# Patient Record
(demographics unavailable — no encounter records)

---

## 2025-06-19 NOTE — DISCUSSION/SUMMARY
[de-identified] : The underlying pathophysiology was reviewed with the patient. XR films were reviewed with the patient. Discussed at length the nature of the patients condition. The right hand symptoms are secondary to   Acute angulated fifth metacarpal neck fracture and Acute nondisplaced base of fourth metacarpal fracture.  I reassured the patient that his fractures will heal with time as bones take six weeks to heal.   A right short arm cast was applied in office today 06/19/2025. Cast instruction were given (i.e. keeping it dry, using a cast shower bag). The patient was instructed on ROM exercises of the shoulder, elbow, hand and wrist while casted. Furthermore, I recommended elevation and pumping of the hand to reduce edema if the cast becomes tight.  I advised to take Tylenol, Advil, or Aleve for pain management.  All questions answered, understanding verbalized. Patient in agreement with plan of care. Patient is advised to follow-up in 4 weeks for cast removal and repeat xrays.  If the patient begins to experience any changes or severe exacerbation of his symptoms, he should reach out to me as soon as possible.

## 2025-06-19 NOTE — PHYSICAL EXAM
[de-identified] : Patient is WDWN, alert, and in no acute distress. Breathing is unlabored. He is grossly oriented to person, place, and time.  Right Hand -  Inspection/Palpation: Tenderness over 5th MP joint and ring metacarpal base. Positive edema. Mild ecchymosis. No deformities. No discoloration.  Stability: No joint instability on provocative testing. Range of Motion: Limited by pain Strength: decreased Sensation is intact.    [de-identified] : Procedure was performed at the [facility]  EXAM:  HAND RIGHT   PROCEDURE DATE:  06/18/2025   INTERPRETATION:  CLINICAL INDICATION:Arthralgia of right hand  COMPARISON: None  TECHNIQUE: Right hand radiographs, 3 views  INTERPRETATION: There is an acute angulated fifth metacarpal neck fracture with apex dorsal angulation. Acute nondisplaced fracture lucency is present at the base of the fourth metacarpal with subtle impaction. No CMC joint dislocation is present. Joint spaces are maintained. Alignment is normal.  IMPRESSION: 1.  Acute angulated fifth metacarpal neck fracture.  2.  Acute nondisplaced base of fourth metacarpal fracture.  --- End of Report ---       SHLOMIT GOLDBERG STEIN MD; Attending Radiologist This document has been electronically signed. Jun 18 2025 12:05PM

## 2025-06-19 NOTE — ADDENDUM
[FreeTextEntry1] : I, Zion Brown wrote this note acting as a scribe for Dr. Xander Graves on 06/19/2025.   All medical record entries made by the Scribe were at my, Dr. Xander Graves MD., direction and personally dictated by me on 06/19/2025. I have personally reviewed the chart and agree that the record accurately reflects my personal performance of the history, physical exam, assessment and plan.

## 2025-06-19 NOTE — PHYSICAL EXAM
[de-identified] : Patient is WDWN, alert, and in no acute distress. Breathing is unlabored. He is grossly oriented to person, place, and time.  Right Hand -  Inspection/Palpation: Tenderness over 5th MP joint and ring metacarpal base. Positive edema. Mild ecchymosis. No deformities. No discoloration.  Stability: No joint instability on provocative testing. Range of Motion: Limited by pain Strength: decreased Sensation is intact.    [de-identified] : Procedure was performed at the [facility]  EXAM:  HAND RIGHT   PROCEDURE DATE:  06/18/2025   INTERPRETATION:  CLINICAL INDICATION:Arthralgia of right hand  COMPARISON: None  TECHNIQUE: Right hand radiographs, 3 views  INTERPRETATION: There is an acute angulated fifth metacarpal neck fracture with apex dorsal angulation. Acute nondisplaced fracture lucency is present at the base of the fourth metacarpal with subtle impaction. No CMC joint dislocation is present. Joint spaces are maintained. Alignment is normal.  IMPRESSION: 1.  Acute angulated fifth metacarpal neck fracture.  2.  Acute nondisplaced base of fourth metacarpal fracture.  --- End of Report ---       SHLOMIT GOLDBERG STEIN MD; Attending Radiologist This document has been electronically signed. Jun 18 2025 12:05PM

## 2025-06-19 NOTE — HISTORY OF PRESENT ILLNESS
[de-identified] : Pt presents with c/o right hand pain since 06/16/2025 s/p fall. Pt is an . 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 form a 3-4 feet height)  .Pt went to urgent care 2 day after his fall had xray done that shows an acute angulated fifth metacarpal neck fracture and acute nondisplaced base of fourth metacarpal fracture. Pt was placed on a splint. Pt removed the splint due to pain. Pt states he ha swelling, and numbness, states stretching fingers exacerbates pain. Pt is not taking pain meds at this time. pt did not go back to work yet  The patient's past medical history, past surgical history, medications, allergies, and social history were reviewed by me today with the patient and documented accordingly. In addition, the patient's family history, which is noncontributory to this visit, was also reviewed.

## 2025-06-19 NOTE — RETURN TO WORK/SCHOOL
[FreeTextEntry1] : Mr. VIOLETA WOLFE was seen in the office today on 06/19/2025 and evaluated by me for an Orthopedic visit. Please be advised that he will not return to work until further notice.  DX: Right fourth and fifth metacarpal fractures. He needs to stay in his cast for 4 weeks.          [FreeTextEntry2] : Dr. Xander Graves M.D. on 06/19/2025

## 2025-06-19 NOTE — HISTORY OF PRESENT ILLNESS
[de-identified] : Pt presents with c/o right hand pain since 06/16/2025 s/p fall. Pt is an . 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 form a 3-4 feet height)  .Pt went to urgent care 2 day after his fall had xray done that shows an acute angulated fifth metacarpal neck fracture and acute nondisplaced base of fourth metacarpal fracture. Pt was placed on a splint. Pt removed the splint due to pain. Pt states he ha swelling, and numbness, states stretching fingers exacerbates pain. Pt is not taking pain meds at this time. pt did not go back to work yet  The patient's past medical history, past surgical history, medications, allergies, and social history were reviewed by me today with the patient and documented accordingly. In addition, the patient's family history, which is noncontributory to this visit, was also reviewed.

## 2025-06-19 NOTE — DISCUSSION/SUMMARY
[de-identified] : The underlying pathophysiology was reviewed with the patient. XR films were reviewed with the patient. Discussed at length the nature of the patients condition. The right hand symptoms are secondary to   Acute angulated fifth metacarpal neck fracture and Acute nondisplaced base of fourth metacarpal fracture.  I reassured the patient that his fractures will heal with time as bones take six weeks to heal.   A right short arm cast was applied in office today 06/19/2025. Cast instruction were given (i.e. keeping it dry, using a cast shower bag). The patient was instructed on ROM exercises of the shoulder, elbow, hand and wrist while casted. Furthermore, I recommended elevation and pumping of the hand to reduce edema if the cast becomes tight.  I advised to take Tylenol, Advil, or Aleve for pain management.  All questions answered, understanding verbalized. Patient in agreement with plan of care. Patient is advised to follow-up in 4 weeks for cast removal and repeat xrays.  If the patient begins to experience any changes or severe exacerbation of his symptoms, he should reach out to me as soon as possible.

## 2025-07-17 NOTE — DISCUSSION/SUMMARY
[de-identified] : The underlying pathophysiology was reviewed with the patient. XR films were reviewed with the patient. Discussed at length the nature of the patients condition. The right hand symptoms are secondary to   Acute angulated fifth metacarpal neck fracture and Acute nondisplaced base of fourth metacarpal fracture.  I reassured the patient that his fractures are healing well. His swelling will go down with time.   The right short arm cast was removed today 07/17/2025. The patient was instructed on keeping the area dry. They may then begin to massage the area with vitamin E oil or lotion. Gentle range of motion, stretching, and strengthening exercises were encouraged. Patient should actively work on gradually increasing use of the hand, as tolerated.  Patient was advised to try OTC medication and topical analgesics for pain management. I recommend that the patient utilize Tylenol, Advil, Aleve, Voltaren gel, Icy Hot, Biofreeze, Bengay, or 4% lidocaine patches.  All questions answered, understanding verbalized. Patient in agreement with plan of care. Patient is advised to follow-up in 4 weeks or as needed.  If the patient begins to experience any changes or severe exacerbation of his symptoms, he should reach out to me as soon as possible.

## 2025-07-17 NOTE — RETURN TO WORK/SCHOOL
[FreeTextEntry1] : Mr. VIOLETA WOLFE was seen in the office today on 07/17/2025 and evaluated by me for an Orthopedic visit. Please be advised that he may return to work in 4 weeks.       [FreeTextEntry2] : Dr. Xander Graves M.D.

## 2025-07-17 NOTE — HISTORY OF PRESENT ILLNESS
[de-identified] : Pt presents with c/o right hand pain since 06/16/2025 s/p fall. Pt is an . 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 form a 3-4 feet height)  .Pt went to urgent care 2 day after his fall had xray done that shows an acute angulated fifth metacarpal neck fracture and acute nondisplaced base of fourth metacarpal fracture. Pt was placed on a splint. Pt removed the splint due to pain. Pt states he ha swelling, and numbness, states stretching fingers exacerbates pain. Pt is not taking pain meds at this time. pt did not go back to work yet  The patient's past medical history, past surgical history, medications, allergies, and social history were reviewed by me today with the patient and documented accordingly. In addition, the patient's family history, which is noncontributory to this visit, was also reviewed.   Today, 07/17/2025, the patient presents for a follow-up evaluation and further care. He reports his pain and swelling continues. He is here for cast removal.

## 2025-07-17 NOTE — ADDENDUM
[FreeTextEntry1] : I, Zion Brown wrote this note acting as a scribe for Dr. Xander Graves on 07/17/2025.   All medical record entries made by the Scribe were at my, Dr. Xander Graves MD., direction and personally dictated by me on 07/17/2025. I have personally reviewed the chart and agree that the record accurately reflects my personal performance of the history, physical exam, assessment and plan.  <-- Click to add NO pertinent Family History

## 2025-07-17 NOTE — PHYSICAL EXAM
[de-identified] : Patient is WDWN, alert, and in no acute distress. Breathing is unlabored. He is grossly oriented to person, place, and time.  Right Hand -  Inspection/Palpation: Cast removed. Tenderness over 5th MP joint and ring metacarpal base. Positive edema. Mild ecchymosis. No deformities. No discoloration.  Stability: No joint instability on provocative testing. Range of Motion: Limited by pain Strength: decreased Sensation is intact.    [de-identified] : AP, lateral and oblique views of the right hand were obtained today and revealed fifth metacarpal neck fracture and base of fourth metacarpal fracture healing well.